# Patient Record
Sex: MALE | Race: WHITE | NOT HISPANIC OR LATINO | Employment: OTHER | URBAN - METROPOLITAN AREA
[De-identification: names, ages, dates, MRNs, and addresses within clinical notes are randomized per-mention and may not be internally consistent; named-entity substitution may affect disease eponyms.]

---

## 2017-01-20 RX ORDER — WARFARIN SODIUM 3 MG/1
3 TABLET ORAL
Qty: 130 TABLET | Refills: 0 | Status: SHIPPED | OUTPATIENT
Start: 2017-01-20

## 2017-02-16 ENCOUNTER — TELEPHONE (OUTPATIENT)
Dept: CARDIOLOGY | Facility: CLINIC | Age: 62
End: 2017-02-16

## 2017-03-09 RX ORDER — DILTIAZEM HYDROCHLORIDE 180 MG/1
180 CAPSULE, COATED, EXTENDED RELEASE ORAL DAILY
Qty: 90 CAPSULE | Refills: 3 | Status: SHIPPED | OUTPATIENT
Start: 2017-03-09 | End: 2017-03-15 | Stop reason: SDUPTHER

## 2017-03-15 RX ORDER — DILTIAZEM HYDROCHLORIDE 180 MG/1
180 CAPSULE, COATED, EXTENDED RELEASE ORAL DAILY
Qty: 90 CAPSULE | Refills: 3 | Status: SHIPPED | OUTPATIENT
Start: 2017-03-15 | End: 2017-06-16

## 2017-03-29 ENCOUNTER — TELEPHONE (OUTPATIENT)
Dept: CARDIOLOGY | Facility: CLINIC | Age: 62
End: 2017-03-29

## 2017-06-16 ENCOUNTER — OFFICE VISIT (OUTPATIENT)
Dept: CARDIOLOGY | Facility: CLINIC | Age: 62
End: 2017-06-16

## 2017-06-16 VITALS
HEIGHT: 75 IN | WEIGHT: 314 LBS | BODY MASS INDEX: 39.04 KG/M2 | DIASTOLIC BLOOD PRESSURE: 72 MMHG | SYSTOLIC BLOOD PRESSURE: 123 MMHG | HEART RATE: 70 BPM

## 2017-06-16 DIAGNOSIS — I10 BENIGN HYPERTENSION: Primary | ICD-10-CM

## 2017-06-16 DIAGNOSIS — I48.0 PAROXYSMAL ATRIAL FIBRILLATION (HCC): ICD-10-CM

## 2017-06-16 PROCEDURE — 99213 OFFICE O/P EST LOW 20 MIN: CPT | Performed by: PHYSICIAN ASSISTANT

## 2017-06-16 RX ORDER — METOPROLOL TARTRATE 100 MG/1
150 TABLET ORAL 2 TIMES DAILY
COMMUNITY

## 2017-06-16 RX ORDER — LEVOTHYROXINE SODIUM 0.05 MG/1
50 TABLET ORAL DAILY
COMMUNITY
End: 2018-09-21 | Stop reason: ALTCHOICE

## 2017-06-16 NOTE — PROGRESS NOTES
Subjective:   Kaushik Dinh  1955  [phone]  [phone]    06/16/2017    [facility] CARDIOLOGY    Jihan Salazar MD  [facility] [address] [address]    REFERRING DOCTOR: Dr. Thayer       Patient ID: Kaushik Dinh is a 62 y.o. male.    Chief Complaint: No chief complaint on file.    Problem List:    1. Atrial fibrillation:   a. Holter monitor 06/26/2003: Atrial fibrillation with heart rate in the 130s to 160s. Minimum 45. Maximum 200 beats per minute.  b. A 2D echocardiogram on 05/06/2003: Normal ejection fraction with no valvular heart wall motion abnormality.  c. Left heart catheterization in 1993: Normal ejection fraction; normal coronary arteries.   d. Admission for sustained supraventricular tachycardia in February 2007 with a narrow complex tachycardia at 170 beats per minute.  e. Status post electrophysiology study on 05/03/2007 with supraventricular tachycardia and ablation for typical AVNRT, Dr. Hawk.  f. Nonsustained paroxysmal atrial fibrillation/atrial tachycardia with ablation from the posterior right atrium.   g. Recurrent atrial fibrillation with rapid ventricular response in November 2008 requiring hospitalization with conversion to sinus rhythm with IV Diltiazem.  h. Initiation of amiodarone therapy, 12/03/2008. This was started after discontinuation of Tikosyn with subsequent effects of insomnia, fatigue, night baldwin with cessation.  i. Cessation of amiodarone due to intolerance.  j. Patient currently maintained on Coumadin and Cardizem. Currently in normal sinus rhythm with frequent PACs and couplets as well.  2. Benign hypertension.  3. Seizure disorder.   4. Type 2 diabetes.  5. Hypercholesterolemia.   6. History of tobacco, quitting years ago.  7. Obesity.   8. Chronic obstructive pulmonary disease.  9. Obstructive sleep apnea.  10. Surgical History:  a. Colon polypectomy.  b. AV shell reentry tachycardia ablation.  c. Paroxysmal atrial  fibrillation/atrial tachycardia ablation.    Allergies   Allergen Reactions   • Amiodarone        Current Outpatient Prescriptions:   •  ALBUTEROL IN, Inhale., Disp: , Rfl:   •  Armodafinil (NUVIGIL) 150 MG tablet, Take 150 mg by mouth Daily., Disp: , Rfl:   •  atorvastatin (LIPITOR) 20 MG tablet, Take 20 mg by mouth Daily., Disp: , Rfl:   •  chlorthalidone (HYGROTON) 25 MG tablet, Take 25 mg by mouth Daily., Disp: , Rfl:   •  HYDROcodone-acetaminophen (NORCO) 7.5-325 MG per tablet, Take 1 tablet by mouth Every 6 (Six) Hours As Needed for moderate pain (4-6)., Disp: , Rfl:   •  insulin glargine (LANTUS) 100 UNIT/ML injection, Inject 45 Units under the skin Daily., Disp: , Rfl:   •  levETIRAcetam (KEPPRA) 500 MG tablet, Take 500 mg by mouth 2 (Two) Times a Day., Disp: , Rfl:   •  levothyroxine (SYNTHROID, LEVOTHROID) 50 MCG tablet, Take 50 mcg by mouth Daily., Disp: , Rfl:   •  lisinopril (PRINIVIL,ZESTRIL) 20 MG tablet, Take 1 tablet by mouth Daily., Disp: 90 tablet, Rfl: 2  •  Loratadine 10 MG capsule, Take  by mouth., Disp: , Rfl:   •  metFORMIN (GLUCOPHAGE) 1000 MG tablet, Take 1,000 mg by mouth 2 (Two) Times a Day With Meals., Disp: , Rfl:   •  metoprolol tartrate (LOPRESSOR) 100 MG tablet, Take 150 mg by mouth 2 (Two) Times a Day., Disp: , Rfl:   •  nabumetone (RELAFEN) 750 MG tablet, Take 750 mg by mouth 2 (Two) Times a Day., Disp: , Rfl:   •  omeprazole (priLOSEC) 20 MG capsule, Take 20 mg by mouth Daily., Disp: , Rfl:   •  potassium citrate (UROCIT-K) 10 MEQ (1080 MG) CR tablet, Take 10 mEq by mouth 2 (Two) Times a Day With Meals., Disp: , Rfl:   •  pregabalin (LYRICA) 150 MG capsule, Take 150 mg by mouth 3 (Three) Times a Day., Disp: , Rfl:   •  warfarin (COUMADIN) 3 MG tablet, Take 1 tablet by mouth Daily. Take 1 tablet daily or as directed., Disp: 130 tablet, Rfl: 0  •  spironolactone (ALDACTONE) 50 MG tablet, Take 50 mg by mouth Daily., Disp: , Rfl:     History of Present Illness    Patient presents  "today for further follow-up and management of his hx of afib and PACs. He has been doing well from cardiac standpoint. Stated he can occasionally feel his heart beat irregularly, but it is very brief. No issues with chest pain, shortness of breath, fevers, chills, night sweats, PND, orthopnea. No recent ER visits or hospital stays.      The following portions of the patient's history were reviewed and updated as appropriate: allergies, current medications, past family history, past medical history, past social history, past surgical history and problem list.    ROS   14 point ROS negative except as outlined in problem list, HPI and other parts of the note.      ECG 12 Lead  Date/Time: 6/16/2017 1:35 PM  Performed by: REGIS ROBERTS  Authorized by: REGIS ROBERTS   Rhythm: sinus rhythm  Ectopy: infrequent PVCs  Rate: normal  BPM: 70  Conduction: conduction normal  ST Segments: ST segments normal  T Waves: T waves normal  QRS axis: normal  Other: no other findings  Clinical impression: normal ECG               Objective:       Vitals:    06/16/17 1313   BP: 123/72   BP Location: Right arm   Patient Position: Sitting   Pulse: 70   Weight: (!) 314 lb (142 kg)   Height: 75\" (190.5 cm)       GENERAL: Well-developed, well-nourished patient in no acute distress.  HEENT: Normocephalic, atraumatic, PERRLA. Moist mucous membranes.  NECK: No JVD present at 30°. No carotid bruits auscultated.  LUNGS: Clear to auscultation.  CARDIOVASCULAR: Heart has a regular rate and rhythm. No murmurs, gallops or rubs noted.   ABDOMEN: Soft, nontender. Positive bowel sounds.  MUSCULOSKELETAL: No gross deformities. No clubbing, cyanosis, or lower extremity edema.  SKIN: Pink, warm  Neuro: Nonfocal exam. Gait intact  Ext: No edema or bruising    The patient's old records including ambulatory rhythm recordings (ECGs, Holter/event monitor) were reviewed and discussed.      Lab Review:   Results for orders placed or performed in visit on 12/15/16 "   Protime-INR   Result Value Ref Range    INR 2.20            Diagnosis:   There are no diagnoses linked to this encounter.    Assessment & Plan:     1. PAF/PACs- patient with hx of PAF and frequent PACs noted on most recent event monitor. Continue Metoprolol 100mg BID and Coumadin for anticoagulation. If increase in symptoms, can consider AAD at that time.     2. HTN, controlled.     3. Follow-up in 6 months.          MAYRA Juan  06/16/17  1:23 PM

## 2018-01-19 ENCOUNTER — OFFICE VISIT (OUTPATIENT)
Dept: CARDIOLOGY | Facility: CLINIC | Age: 63
End: 2018-01-19

## 2018-01-19 VITALS
SYSTOLIC BLOOD PRESSURE: 130 MMHG | HEIGHT: 75 IN | DIASTOLIC BLOOD PRESSURE: 78 MMHG | HEART RATE: 76 BPM | WEIGHT: 315 LBS | BODY MASS INDEX: 39.17 KG/M2

## 2018-01-19 DIAGNOSIS — I48.0 PAROXYSMAL ATRIAL FIBRILLATION (HCC): Primary | ICD-10-CM

## 2018-01-19 DIAGNOSIS — I10 BENIGN HYPERTENSION: ICD-10-CM

## 2018-01-19 PROCEDURE — 93000 ELECTROCARDIOGRAM COMPLETE: CPT | Performed by: NURSE PRACTITIONER

## 2018-01-19 PROCEDURE — 99213 OFFICE O/P EST LOW 20 MIN: CPT | Performed by: INTERNAL MEDICINE

## 2018-01-19 NOTE — PROGRESS NOTES
Kaushik WAYNE [place]  1955  [phone]  [phone]    01/19/2018    [facility] CARDIOLOGY     Jihan Salazar MD  [facility] [address]  [facility] [address]    Chief Complaint   Patient presents with   • Benign hypertension   • Atrial Fibrillation       Problem List:   1. Atrial fibrillation:   a. Holter monitor 06/26/2003: Atrial fibrillation with heart rate in the 130s to 160s. Minimum 45. Maximum 200 beats per minute.  b. A 2D echocardiogram on 05/06/2003: Normal ejection fraction with no valvular heart wall motion abnormality.  c. Left heart catheterization in 1993: Normal ejection fraction; normal coronary arteries.   d. Admission for sustained supraventricular tachycardia in February 2007 with a narrow complex tachycardia at 170 beats per minute.  e. Status post electrophysiology study on 05/03/2007 with supraventricular tachycardia and ablation for typical AVNRT, Dr. Hawk.  f. Nonsustained paroxysmal atrial fibrillation/atrial tachycardia with ablation from the posterior right atrium.   g. Recurrent atrial fibrillation with rapid ventricular response in November 2008 requiring hospitalization with conversion to sinus rhythm with IV Diltiazem.  h. Initiation of amiodarone therapy, 12/03/2008. This was started after discontinuation of Tikosyn with subsequent effects of insomnia, fatigue, night baldwin with cessation.  i. Cessation of amiodarone due to intolerance.  j. Patient currently maintained on Coumadin and Cardizem. Currently in normal sinus rhythm with frequent PACs and couplets as well.  k. Holter monitor 03/2016: No recurrence of atrial fibrillation, normal sinus rhythm with PVCs  2. Benign hypertension.  3. Seizure disorder.   4. Type 2 diabetes.  5. Hypercholesterolemia.   6. History of tobacco, quitting years ago.  7. Obesity.   8. Chronic obstructive pulmonary disease.  9. Obstructive sleep apnea.  10. Surgical History:  a. Colon polypectomy.  b. AV shell reentry  tachycardia ablation.  c. Paroxysmal atrial fibrillation/atrial tachycardia ablation.       Allergies  Allergies   Allergen Reactions   • Amiodarone        Current Medications    Current Outpatient Prescriptions:   •  ALBUTEROL IN, Inhale., Disp: , Rfl:   •  atorvastatin (LIPITOR) 20 MG tablet, Take 20 mg by mouth Daily., Disp: , Rfl:   •  chlorthalidone (HYGROTON) 25 MG tablet, Take 25 mg by mouth Daily., Disp: , Rfl:   •  HYDROcodone-acetaminophen (NORCO) 7.5-325 MG per tablet, Take 1 tablet by mouth Every 6 (Six) Hours As Needed for moderate pain (4-6)., Disp: , Rfl:   •  insulin glargine (LANTUS) 100 UNIT/ML injection, Inject 120 Units under the skin Daily., Disp: , Rfl:   •  levETIRAcetam (KEPPRA) 500 MG tablet, Take 500 mg by mouth 2 (Two) Times a Day., Disp: , Rfl:   •  levothyroxine (SYNTHROID, LEVOTHROID) 50 MCG tablet, Take 50 mcg by mouth Daily., Disp: , Rfl:   •  lisinopril (PRINIVIL,ZESTRIL) 20 MG tablet, Take 1 tablet by mouth Daily., Disp: 90 tablet, Rfl: 2  •  Loratadine 10 MG capsule, Take  by mouth., Disp: , Rfl:   •  metFORMIN (GLUCOPHAGE) 1000 MG tablet, Take 1,000 mg by mouth 2 (Two) Times a Day With Meals., Disp: , Rfl:   •  metoprolol tartrate (LOPRESSOR) 100 MG tablet, Take 150 mg by mouth 2 (Two) Times a Day., Disp: , Rfl:   •  nabumetone (RELAFEN) 750 MG tablet, Take 750 mg by mouth 2 (Two) Times a Day., Disp: , Rfl:   •  potassium citrate (UROCIT-K) 10 MEQ (1080 MG) CR tablet, Take 10 mEq by mouth 2 (Two) Times a Day With Meals., Disp: , Rfl:   •  pregabalin (LYRICA) 150 MG capsule, Take 150 mg by mouth 3 (Three) Times a Day., Disp: , Rfl:   •  warfarin (COUMADIN) 3 MG tablet, Take 1 tablet by mouth Daily. Take 1 tablet daily or as directed., Disp: 130 tablet, Rfl: 0    History of Present Illness   HPI: Pt presents for follow up of paroxysmal atrial fibrillation.  Since we last saw patient, he denies any CP, LH, and dizziness. He reports chronically being short of air but this remains  "unchanged. He reports having fatigue which was the only symptom experienced with previous episodes of A Fib. He relates this to hyperglycemia stating he eats until he falls asleep. Denies any hospitalizations, ER visits, bleeding, or TIA/CVA symptoms. He is anticoagulated with Coumadin. INR is being monitored at home and phoned into his local cardiologist. He has TRISTA, compliant with C-pap. Reports blood pressure is well-controlled.     ROS:  General:  Denies fatigue, +  weight gain over the last 3-4 years, denies weight loss  Cardiovascular:  Denies CP, PND, syncope, near syncope, edema or palpitations.  Pulmonary:  Denies BROWN, cough, or wheezing      Vitals:    01/19/18 1158   BP: 130/78   BP Location: Left arm   Patient Position: Sitting   Pulse: 76   Weight: (!) 143 kg (315 lb)   Height: 190.5 cm (75\")     PE:  General: NAD  Neck: no JVD, no carotid bruits,   Heart Irregular rhythm, regular rate, NL S1, S2,  no rubs, murmurs  Lungs: CTA, no wheezes, rhonchi, or rales  Abd: soft, non-tender, NL BS  Ext: No musculoskeletal deformities, 2+ edema, cyanosis, or clubbing  Psych: normal mood and affect    Diagnostic Data:        ECG 12 Lead  Date/Time: 1/19/2018 12:40 PM  Performed by: JOHN CARROLL  Authorized by: JOHN CARROLL   Comparison: compared with previous ECG from 6/16/2017  Similar to previous ECG  Rhythm: sinus rhythm  BPM: 69              1. Paroxysmal atrial fibrillation    2. Benign hypertension          Plan:  1) AF  In NSR today with PVCs, no documented recurrence of afib, rates controlled on metoprolol  CHADSVASC = 2 (HTN, DM)  No AAD at this time, failed multiple in the past.   Symptoms of SOB and fatigue no different then they have been for years.    2) Anticoagulation  Continue Coumadin    3) HTN  Continue Metrolprolol  Wt loss, exercise, salt reduction    4) COPD following with pulm.     F/up in 6 months    Scribed for John Carroll DO by EMERY Cisneros. 1/19/2018  12:39 PM     I, " August Abdul DO, personally performed the services described in this documentation as scribed by the above named individual in my presence, and it is both accurate and complete.  1/19/2018  12:52 PM    August Abdul DO  12:52 PM  01/19/18

## 2018-09-21 ENCOUNTER — OFFICE VISIT (OUTPATIENT)
Dept: CARDIOLOGY | Facility: CLINIC | Age: 63
End: 2018-09-21

## 2018-09-21 VITALS
WEIGHT: 310 LBS | HEART RATE: 76 BPM | SYSTOLIC BLOOD PRESSURE: 124 MMHG | BODY MASS INDEX: 38.54 KG/M2 | HEIGHT: 75 IN | DIASTOLIC BLOOD PRESSURE: 64 MMHG

## 2018-09-21 DIAGNOSIS — I10 BENIGN HYPERTENSION: ICD-10-CM

## 2018-09-21 DIAGNOSIS — I48.0 PAROXYSMAL ATRIAL FIBRILLATION (HCC): Primary | ICD-10-CM

## 2018-09-21 DIAGNOSIS — J44.9 CHRONIC OBSTRUCTIVE PULMONARY DISEASE, UNSPECIFIED COPD TYPE (HCC): ICD-10-CM

## 2018-09-21 PROCEDURE — 99213 OFFICE O/P EST LOW 20 MIN: CPT | Performed by: NURSE PRACTITIONER

## 2018-09-21 RX ORDER — NITROGLYCERIN 400 UG/1
1 SPRAY ORAL
COMMUNITY

## 2020-12-06 ENCOUNTER — LAB REQUISITION (OUTPATIENT)
Dept: LAB | Facility: HOSPITAL | Age: 65
End: 2020-12-06

## 2020-12-06 DIAGNOSIS — Z00.00 ROUTINE GENERAL MEDICAL EXAMINATION AT A HEALTH CARE FACILITY: ICD-10-CM

## 2020-12-06 LAB
ANION GAP SERPL CALCULATED.3IONS-SCNC: 13.3 MMOL/L (ref 5–15)
BASOPHILS # BLD AUTO: 0.02 10*3/MM3 (ref 0–0.2)
BASOPHILS NFR BLD AUTO: 0.2 % (ref 0–1.5)
BUN SERPL-MCNC: 15 MG/DL (ref 8–23)
BUN/CREAT SERPL: 11 (ref 7–25)
CALCIUM SPEC-SCNC: 8 MG/DL (ref 8.6–10.5)
CHLORIDE SERPL-SCNC: 95 MMOL/L (ref 98–107)
CO2 SERPL-SCNC: 25.7 MMOL/L (ref 22–29)
CREAT SERPL-MCNC: 1.36 MG/DL (ref 0.76–1.27)
DEPRECATED RDW RBC AUTO: 42.6 FL (ref 37–54)
EOSINOPHIL # BLD AUTO: 0.06 10*3/MM3 (ref 0–0.4)
EOSINOPHIL NFR BLD AUTO: 0.5 % (ref 0.3–6.2)
ERYTHROCYTE [DISTWIDTH] IN BLOOD BY AUTOMATED COUNT: 12.6 % (ref 12.3–15.4)
GFR SERPL CREATININE-BSD FRML MDRD: 52 ML/MIN/1.73
GLUCOSE SERPL-MCNC: 168 MG/DL (ref 65–99)
HCT VFR BLD AUTO: 29.6 % (ref 37.5–51)
HGB BLD-MCNC: 9.8 G/DL (ref 13–17.7)
IMM GRANULOCYTES # BLD AUTO: 0.13 10*3/MM3 (ref 0–0.05)
IMM GRANULOCYTES NFR BLD AUTO: 1.1 % (ref 0–0.5)
LYMPHOCYTES # BLD AUTO: 1.17 10*3/MM3 (ref 0.7–3.1)
LYMPHOCYTES NFR BLD AUTO: 9.6 % (ref 19.6–45.3)
MCH RBC QN AUTO: 30.8 PG (ref 26.6–33)
MCHC RBC AUTO-ENTMCNC: 33.1 G/DL (ref 31.5–35.7)
MCV RBC AUTO: 93.1 FL (ref 79–97)
MONOCYTES # BLD AUTO: 1.51 10*3/MM3 (ref 0.1–0.9)
MONOCYTES NFR BLD AUTO: 12.4 % (ref 5–12)
NEUTROPHILS NFR BLD AUTO: 76.2 % (ref 42.7–76)
NEUTROPHILS NFR BLD AUTO: 9.29 10*3/MM3 (ref 1.7–7)
NRBC BLD AUTO-RTO: 0.1 /100 WBC (ref 0–0.2)
NT-PROBNP SERPL-MCNC: 608.6 PG/ML (ref 0–900)
PLATELET # BLD AUTO: 295 10*3/MM3 (ref 140–450)
PMV BLD AUTO: 9.5 FL (ref 6–12)
POTASSIUM SERPL-SCNC: 4.5 MMOL/L (ref 3.5–5.2)
RBC # BLD AUTO: 3.18 10*6/MM3 (ref 4.14–5.8)
SODIUM SERPL-SCNC: 134 MMOL/L (ref 136–145)
WBC # BLD AUTO: 12.18 10*3/MM3 (ref 3.4–10.8)

## 2020-12-06 PROCEDURE — 80048 BASIC METABOLIC PNL TOTAL CA: CPT

## 2020-12-06 PROCEDURE — 85025 COMPLETE CBC W/AUTO DIFF WBC: CPT

## 2020-12-06 PROCEDURE — 83880 ASSAY OF NATRIURETIC PEPTIDE: CPT

## 2020-12-12 PROCEDURE — 84520 ASSAY OF UREA NITROGEN: CPT

## 2020-12-12 PROCEDURE — 85610 PROTHROMBIN TIME: CPT

## 2020-12-12 PROCEDURE — 82565 ASSAY OF CREATININE: CPT

## 2020-12-13 ENCOUNTER — LAB REQUISITION (OUTPATIENT)
Dept: LAB | Facility: HOSPITAL | Age: 65
End: 2020-12-13

## 2020-12-13 DIAGNOSIS — Z00.00 ROUTINE GENERAL MEDICAL EXAMINATION AT A HEALTH CARE FACILITY: ICD-10-CM

## 2020-12-13 LAB
BUN SERPL-MCNC: 26 MG/DL (ref 8–23)
CREAT SERPL-MCNC: 1.43 MG/DL (ref 0.76–1.27)
GFR SERPL CREATININE-BSD FRML MDRD: 49 ML/MIN/1.73
INR PPP: 2.3 (ref 0.85–1.16)
PROTHROMBIN TIME: 25 SECONDS (ref 11.5–14)

## 2020-12-15 ENCOUNTER — LAB REQUISITION (OUTPATIENT)
Dept: LAB | Facility: HOSPITAL | Age: 65
End: 2020-12-15

## 2020-12-15 DIAGNOSIS — Z00.00 ROUTINE GENERAL MEDICAL EXAMINATION AT A HEALTH CARE FACILITY: ICD-10-CM

## 2020-12-15 DIAGNOSIS — Z79.2 LONG TERM (CURRENT) USE OF ANTIBIOTICS: ICD-10-CM

## 2020-12-15 LAB — VANCOMYCIN TROUGH SERPL-MCNC: 22.6 MCG/ML (ref 5–20)

## 2020-12-15 PROCEDURE — 80202 ASSAY OF VANCOMYCIN: CPT

## 2020-12-16 ENCOUNTER — LAB REQUISITION (OUTPATIENT)
Dept: LAB | Facility: HOSPITAL | Age: 65
End: 2020-12-16

## 2020-12-16 DIAGNOSIS — Z00.00 ROUTINE GENERAL MEDICAL EXAMINATION AT A HEALTH CARE FACILITY: ICD-10-CM

## 2020-12-16 LAB
INR PPP: 1.86 (ref 0.85–1.16)
PROTHROMBIN TIME: 21.1 SECONDS (ref 11.5–14)

## 2020-12-16 PROCEDURE — 85610 PROTHROMBIN TIME: CPT
